# Patient Record
Sex: FEMALE | Race: BLACK OR AFRICAN AMERICAN | NOT HISPANIC OR LATINO
[De-identification: names, ages, dates, MRNs, and addresses within clinical notes are randomized per-mention and may not be internally consistent; named-entity substitution may affect disease eponyms.]

---

## 2017-03-29 ENCOUNTER — APPOINTMENT (OUTPATIENT)
Dept: OBGYN | Facility: CLINIC | Age: 32
End: 2017-03-29

## 2017-03-29 VITALS
WEIGHT: 163 LBS | HEIGHT: 65 IN | DIASTOLIC BLOOD PRESSURE: 85 MMHG | BODY MASS INDEX: 27.16 KG/M2 | SYSTOLIC BLOOD PRESSURE: 140 MMHG

## 2017-03-29 DIAGNOSIS — B96.89 ACUTE VAGINITIS: ICD-10-CM

## 2017-03-29 DIAGNOSIS — N76.0 ACUTE VAGINITIS: ICD-10-CM

## 2017-03-29 DIAGNOSIS — Z87.59 PERSONAL HISTORY OF OTHER COMPLICATIONS OF PREGNANCY, CHILDBIRTH AND THE PUERPERIUM: ICD-10-CM

## 2017-03-29 DIAGNOSIS — D35.2 BENIGN NEOPLASM OF PITUITARY GLAND: ICD-10-CM

## 2017-03-29 DIAGNOSIS — N89.8 OTHER SPECIFIED NONINFLAMMATORY DISORDERS OF VAGINA: ICD-10-CM

## 2017-03-29 RX ORDER — METRONIDAZOLE 7.5 MG/G
0.75 GEL VAGINAL
Qty: 70 | Refills: 1 | Status: ACTIVE | COMMUNITY
Start: 2017-03-29 | End: 1900-01-01

## 2017-03-29 RX ORDER — METRONIDAZOLE 7.5 MG/G
0.75 GEL VAGINAL
Qty: 7 | Refills: 1 | Status: ACTIVE | COMMUNITY
Start: 2017-03-29 | End: 1900-01-01

## 2017-03-29 RX ORDER — BORIC ACID
GRANULES (GRAM) MISCELLANEOUS
Qty: 21 | Refills: 0 | Status: ACTIVE | COMMUNITY
Start: 2017-03-29 | End: 1900-01-01

## 2017-03-30 LAB
CANDIDA VAG CYTO: NOT DETECTED
G VAGINALIS+PREV SP MTYP VAG QL MICRO: DETECTED
T VAGINALIS VAG QL WET PREP: NOT DETECTED

## 2017-03-31 LAB
C TRACH RRNA SPEC QL NAA+PROBE: NORMAL
N GONORRHOEA RRNA SPEC QL NAA+PROBE: NORMAL
SOURCE AMPLIFICATION: NORMAL

## 2017-05-04 ENCOUNTER — APPOINTMENT (OUTPATIENT)
Dept: OBGYN | Facility: CLINIC | Age: 32
End: 2017-05-04

## 2017-05-04 VITALS
WEIGHT: 160 LBS | HEIGHT: 65 IN | DIASTOLIC BLOOD PRESSURE: 84 MMHG | SYSTOLIC BLOOD PRESSURE: 136 MMHG | BODY MASS INDEX: 26.66 KG/M2

## 2017-05-04 DIAGNOSIS — N91.2 AMENORRHEA, UNSPECIFIED: ICD-10-CM

## 2017-05-04 LAB
HCG UR QL: NEGATIVE
QUALITY CONTROL: YES

## 2017-05-04 RX ORDER — MEDROXYPROGESTERONE ACETATE 10 MG/1
10 TABLET ORAL DAILY
Qty: 10 | Refills: 0 | Status: ACTIVE | COMMUNITY
Start: 2017-05-04 | End: 1900-01-01

## 2017-05-05 LAB
T3FREE SERPL-MCNC: 3.07 PG/ML
T4 FREE SERPL-MCNC: 1.1 NG/DL
TSH SERPL-ACNC: 2.12 UIU/ML

## 2017-05-09 LAB — PROLACTIN SERPL-MCNC: 58.2 NG/ML

## 2017-09-19 ENCOUNTER — RESULT REVIEW (OUTPATIENT)
Age: 32
End: 2017-09-19

## 2020-12-15 PROBLEM — N76.0 BACTERIAL VAGINOSIS: Status: RESOLVED | Noted: 2017-03-29 | Resolved: 2020-12-15

## 2024-06-07 ENCOUNTER — EMERGENCY (EMERGENCY)
Facility: HOSPITAL | Age: 39
LOS: 1 days | Discharge: HOME | End: 2024-06-07
Attending: EMERGENCY MEDICINE | Admitting: EMERGENCY MEDICINE
Payer: COMMERCIAL

## 2024-06-07 VITALS
RESPIRATION RATE: 20 BRPM | DIASTOLIC BLOOD PRESSURE: 83 MMHG | OXYGEN SATURATION: 100 % | TEMPERATURE: 98 F | SYSTOLIC BLOOD PRESSURE: 134 MMHG | HEART RATE: 71 BPM

## 2024-06-07 VITALS
HEART RATE: 108 BPM | RESPIRATION RATE: 20 BRPM | WEIGHT: 154.98 LBS | SYSTOLIC BLOOD PRESSURE: 139 MMHG | TEMPERATURE: 98 F | HEIGHT: 65 IN | DIASTOLIC BLOOD PRESSURE: 85 MMHG | OXYGEN SATURATION: 98 %

## 2024-06-07 LAB
ALBUMIN SERPL ELPH-MCNC: 4.3 G/DL — SIGNIFICANT CHANGE UP (ref 3.3–5)
ALP SERPL-CCNC: 99 U/L — SIGNIFICANT CHANGE UP (ref 40–120)
ALT FLD-CCNC: 26 U/L — SIGNIFICANT CHANGE UP (ref 4–33)
ANION GAP SERPL CALC-SCNC: 15 MMOL/L — HIGH (ref 7–14)
APPEARANCE UR: ABNORMAL
APTT BLD: 29.2 SEC — SIGNIFICANT CHANGE UP (ref 24.5–35.6)
AST SERPL-CCNC: 36 U/L — HIGH (ref 4–32)
BACTERIA # UR AUTO: NEGATIVE /HPF — SIGNIFICANT CHANGE UP
BASE EXCESS BLDV CALC-SCNC: -2.9 MMOL/L — LOW (ref -2–3)
BASOPHILS # BLD AUTO: 0.04 K/UL — SIGNIFICANT CHANGE UP (ref 0–0.2)
BASOPHILS NFR BLD AUTO: 0.4 % — SIGNIFICANT CHANGE UP (ref 0–2)
BILIRUB SERPL-MCNC: 0.5 MG/DL — SIGNIFICANT CHANGE UP (ref 0.2–1.2)
BILIRUB UR-MCNC: NEGATIVE — SIGNIFICANT CHANGE UP
BLD GP AB SCN SERPL QL: NEGATIVE — SIGNIFICANT CHANGE UP
BLOOD GAS VENOUS COMPREHENSIVE RESULT: SIGNIFICANT CHANGE UP
BUN SERPL-MCNC: 9 MG/DL — SIGNIFICANT CHANGE UP (ref 7–23)
CALCIUM SERPL-MCNC: 9.3 MG/DL — SIGNIFICANT CHANGE UP (ref 8.4–10.5)
CAST: 4 /LPF — SIGNIFICANT CHANGE UP (ref 0–4)
CHLORIDE BLDV-SCNC: 107 MMOL/L — SIGNIFICANT CHANGE UP (ref 96–108)
CHLORIDE SERPL-SCNC: 106 MMOL/L — SIGNIFICANT CHANGE UP (ref 98–107)
CO2 BLDV-SCNC: 23.9 MMOL/L — SIGNIFICANT CHANGE UP (ref 22–26)
CO2 SERPL-SCNC: 18 MMOL/L — LOW (ref 22–31)
COLOR SPEC: YELLOW — SIGNIFICANT CHANGE UP
CREAT SERPL-MCNC: 0.78 MG/DL — SIGNIFICANT CHANGE UP (ref 0.5–1.3)
DIFF PNL FLD: NEGATIVE — SIGNIFICANT CHANGE UP
EGFR: 98 ML/MIN/1.73M2 — SIGNIFICANT CHANGE UP
EOSINOPHIL # BLD AUTO: 0.05 K/UL — SIGNIFICANT CHANGE UP (ref 0–0.5)
EOSINOPHIL NFR BLD AUTO: 0.6 % — SIGNIFICANT CHANGE UP (ref 0–6)
GAS PNL BLDV: 138 MMOL/L — SIGNIFICANT CHANGE UP (ref 136–145)
GLUCOSE BLDV-MCNC: 110 MG/DL — HIGH (ref 70–99)
GLUCOSE SERPL-MCNC: 113 MG/DL — HIGH (ref 70–99)
GLUCOSE UR QL: NEGATIVE MG/DL — SIGNIFICANT CHANGE UP
HCG SERPL-ACNC: <1 MIU/ML — SIGNIFICANT CHANGE UP
HCG UR QL: NEGATIVE — SIGNIFICANT CHANGE UP
HCO3 BLDV-SCNC: 23 MMOL/L — SIGNIFICANT CHANGE UP (ref 22–29)
HCT VFR BLD CALC: 35.4 % — SIGNIFICANT CHANGE UP (ref 34.5–45)
HCT VFR BLDA CALC: 34 % — LOW (ref 34.5–46.5)
HGB BLD CALC-MCNC: 12.8 G/DL — SIGNIFICANT CHANGE UP (ref 11.7–16.1)
HGB BLD-MCNC: 12.6 G/DL — SIGNIFICANT CHANGE UP (ref 11.5–15.5)
IANC: 7.58 K/UL — HIGH (ref 1.8–7.4)
IMM GRANULOCYTES NFR BLD AUTO: 0.3 % — SIGNIFICANT CHANGE UP (ref 0–0.9)
INR BLD: 1.01 RATIO — SIGNIFICANT CHANGE UP (ref 0.85–1.18)
KETONES UR-MCNC: ABNORMAL MG/DL
LACTATE BLDV-MCNC: 1.9 MMOL/L — SIGNIFICANT CHANGE UP (ref 0.5–2)
LEUKOCYTE ESTERASE UR-ACNC: NEGATIVE — SIGNIFICANT CHANGE UP
LIDOCAIN IGE QN: 58 U/L — SIGNIFICANT CHANGE UP (ref 7–60)
LYMPHOCYTES # BLD AUTO: 0.84 K/UL — LOW (ref 1–3.3)
LYMPHOCYTES # BLD AUTO: 9.4 % — LOW (ref 13–44)
MCHC RBC-ENTMCNC: 31.9 PG — SIGNIFICANT CHANGE UP (ref 27–34)
MCHC RBC-ENTMCNC: 35.6 GM/DL — SIGNIFICANT CHANGE UP (ref 32–36)
MCV RBC AUTO: 89.6 FL — SIGNIFICANT CHANGE UP (ref 80–100)
MONOCYTES # BLD AUTO: 0.41 K/UL — SIGNIFICANT CHANGE UP (ref 0–0.9)
MONOCYTES NFR BLD AUTO: 4.6 % — SIGNIFICANT CHANGE UP (ref 2–14)
NEUTROPHILS # BLD AUTO: 7.58 K/UL — HIGH (ref 1.8–7.4)
NEUTROPHILS NFR BLD AUTO: 84.7 % — HIGH (ref 43–77)
NITRITE UR-MCNC: NEGATIVE — SIGNIFICANT CHANGE UP
NRBC # BLD: 0 /100 WBCS — SIGNIFICANT CHANGE UP (ref 0–0)
NRBC # FLD: 0 K/UL — SIGNIFICANT CHANGE UP (ref 0–0)
PCO2 BLDV: 41 MMHG — SIGNIFICANT CHANGE UP (ref 39–52)
PH BLDV: 7.35 — SIGNIFICANT CHANGE UP (ref 7.32–7.43)
PH UR: 6 — SIGNIFICANT CHANGE UP (ref 5–8)
PLATELET # BLD AUTO: 328 K/UL — SIGNIFICANT CHANGE UP (ref 150–400)
PO2 BLDV: 23 MMHG — LOW (ref 25–45)
POTASSIUM BLDV-SCNC: 3.4 MMOL/L — LOW (ref 3.5–5.1)
POTASSIUM SERPL-MCNC: 4 MMOL/L — SIGNIFICANT CHANGE UP (ref 3.5–5.3)
POTASSIUM SERPL-SCNC: 4 MMOL/L — SIGNIFICANT CHANGE UP (ref 3.5–5.3)
PROT SERPL-MCNC: 7.2 G/DL — SIGNIFICANT CHANGE UP (ref 6–8.3)
PROT UR-MCNC: 30 MG/DL
PROTHROM AB SERPL-ACNC: 11.3 SEC — SIGNIFICANT CHANGE UP (ref 9.5–13)
RBC # BLD: 3.95 M/UL — SIGNIFICANT CHANGE UP (ref 3.8–5.2)
RBC # FLD: 12.8 % — SIGNIFICANT CHANGE UP (ref 10.3–14.5)
RBC CASTS # UR COMP ASSIST: 4 /HPF — SIGNIFICANT CHANGE UP (ref 0–4)
REVIEW: SIGNIFICANT CHANGE UP
RH IG SCN BLD-IMP: POSITIVE — SIGNIFICANT CHANGE UP
SAO2 % BLDV: 41.8 % — LOW (ref 67–88)
SODIUM SERPL-SCNC: 139 MMOL/L — SIGNIFICANT CHANGE UP (ref 135–145)
SP GR SPEC: 1.04 — HIGH (ref 1–1.03)
SQUAMOUS # UR AUTO: 6 /HPF — HIGH (ref 0–5)
UROBILINOGEN FLD QL: 1 MG/DL — SIGNIFICANT CHANGE UP (ref 0.2–1)
WBC # BLD: 8.95 K/UL — SIGNIFICANT CHANGE UP (ref 3.8–10.5)
WBC # FLD AUTO: 8.95 K/UL — SIGNIFICANT CHANGE UP (ref 3.8–10.5)
WBC UR QL: 1 /HPF — SIGNIFICANT CHANGE UP (ref 0–5)

## 2024-06-07 PROCEDURE — 93010 ELECTROCARDIOGRAM REPORT: CPT

## 2024-06-07 PROCEDURE — 99285 EMERGENCY DEPT VISIT HI MDM: CPT

## 2024-06-07 PROCEDURE — 76830 TRANSVAGINAL US NON-OB: CPT | Mod: 26

## 2024-06-07 PROCEDURE — 74177 CT ABD & PELVIS W/CONTRAST: CPT | Mod: 26,MC

## 2024-06-07 RX ORDER — ONDANSETRON 8 MG/1
4 TABLET, FILM COATED ORAL ONCE
Refills: 0 | Status: COMPLETED | OUTPATIENT
Start: 2024-06-07 | End: 2024-06-07

## 2024-06-07 RX ORDER — SODIUM CHLORIDE 9 MG/ML
1000 INJECTION INTRAMUSCULAR; INTRAVENOUS; SUBCUTANEOUS ONCE
Refills: 0 | Status: COMPLETED | OUTPATIENT
Start: 2024-06-07 | End: 2024-06-07

## 2024-06-07 RX ORDER — ONDANSETRON 8 MG/1
1 TABLET, FILM COATED ORAL
Qty: 1 | Refills: 0
Start: 2024-06-07

## 2024-06-07 RX ORDER — MORPHINE SULFATE 50 MG/1
4 CAPSULE, EXTENDED RELEASE ORAL ONCE
Refills: 0 | Status: DISCONTINUED | OUTPATIENT
Start: 2024-06-07 | End: 2024-06-07

## 2024-06-07 RX ORDER — ACETAMINOPHEN 500 MG
1000 TABLET ORAL ONCE
Refills: 0 | Status: COMPLETED | OUTPATIENT
Start: 2024-06-07 | End: 2024-06-07

## 2024-06-07 RX ORDER — KETOROLAC TROMETHAMINE 30 MG/ML
15 SYRINGE (ML) INJECTION ONCE
Refills: 0 | Status: DISCONTINUED | OUTPATIENT
Start: 2024-06-07 | End: 2024-06-07

## 2024-06-07 RX ORDER — FAMOTIDINE 10 MG/ML
20 INJECTION INTRAVENOUS ONCE
Refills: 0 | Status: COMPLETED | OUTPATIENT
Start: 2024-06-07 | End: 2024-06-07

## 2024-06-07 RX ADMIN — Medication 1000 MILLIGRAM(S): at 10:10

## 2024-06-07 RX ADMIN — FAMOTIDINE 20 MILLIGRAM(S): 10 INJECTION INTRAVENOUS at 06:57

## 2024-06-07 RX ADMIN — Medication 15 MILLIGRAM(S): at 06:58

## 2024-06-07 RX ADMIN — ONDANSETRON 4 MILLIGRAM(S): 8 TABLET, FILM COATED ORAL at 06:57

## 2024-06-07 RX ADMIN — ONDANSETRON 4 MILLIGRAM(S): 8 TABLET, FILM COATED ORAL at 10:41

## 2024-06-07 RX ADMIN — Medication 400 MILLIGRAM(S): at 09:12

## 2024-06-07 RX ADMIN — SODIUM CHLORIDE 1000 MILLILITER(S): 9 INJECTION INTRAMUSCULAR; INTRAVENOUS; SUBCUTANEOUS at 06:56

## 2024-06-07 RX ADMIN — MORPHINE SULFATE 4 MILLIGRAM(S): 50 CAPSULE, EXTENDED RELEASE ORAL at 07:01

## 2024-06-07 RX ADMIN — Medication 1000 MILLIGRAM(S): at 09:30

## 2024-06-07 NOTE — ED PROVIDER NOTE - PATIENT PORTAL LINK FT
You can access the FollowMyHealth Patient Portal offered by [facility] by registering at the following website: [email or web address]. By joining [employer]’s FollowMyHealth portal, you will also be able to view your health information using other applications (apps) compatible with our system.

## 2024-06-07 NOTE — ED PROVIDER NOTE - ATTENDING CONTRIBUTION TO CARE
This is an otherwise healthy 39-year-old female no past surgical history presenting with sudden onset of abdominal pain.  Patient states started this morning and then accompanied by multiple episodes of vomiting.  States she has never felt this before.  Patient states it is severe and constant in nature.  Patient states it was worse in her upper abdomen but it is throughout.  She has no chest pain no shortness of breath no leg swelling no urinary symptoms no vaginal bleeding.  Patient states when this happened she was also diaphoretic.  Patient is uncomfortable appearing with generalized tenderness throughout her abdomen upon palpation.  She is well-nourished.  Regular rate and rhythm lungs are clear to auscultation she has generalized tenderness throughout her abdomen no rebound no guarding no CVA tenderness to palpation.  Moving all extremities pulses intact.  Will provide analgesia, antiemetic, fluids, labs will obtain both CT abdomen pelvis as well as transvaginal imaging to rule out any intra-abdominal pathology.  Patient signed out to oncoming team pending results.

## 2024-06-07 NOTE — CONSULT NOTE ADULT - ASSESSMENT
*incomplete note*    38 y/o P2 LMP 5/20 presenting with severe epigastric pain that woke her up at 4am. TVUS within normal limits, CTAP showing 3.6cm right ovarian dermoid cyst.  38 y/o P2 LMP 5/20 presenting with severe epigastric pain that woke her up at 4am. TVUS within normal limits, CTAP showing 3.6cm right ovarian dermoid cyst. Pelvic exam with no adnexal tenderness appreciated, abdominal exam with generalized abdominal tenderness. Vitals signs stable and labs within normal limits. Patient reports significant improvement in pain compared to initial presentation, denies nausea or vomiting within past 2 hours and has not needed additional pain management. Blood flow demonstrated on US. Patient with overall benign abdomen although with some mild tenderness to palpation on side of cyst. Low concern for torsion at this time given benign abdomen and clinical picture with overall well apperaing, well mentating patient.      - No acute GYN intervention at this time  - patient counseled on PO pain medication for pain relief prn   - patient instructed to follow up with OBGYN in one to two weeks   - patient cleared for discharge from OBGYN perspective.  Primary management per ED team.     Rachelle Eli,   Seen w/ Dr. Mendieta

## 2024-06-07 NOTE — ED PROVIDER NOTE - PHYSICAL EXAMINATION
General: Alert and Orientated x 3. Diaphoretic, anxious appearing   Eyes: No scleral icterus.   ENT: Mucous membranes moist  Cardiac: No murmurs appreciated.   Pulmonary: CTA bilaterally. No increased WOB.   Abdominal: Soft, Non-distended. Significant tenderness   Neurologic: No focal sensory or motor deficits.  Extremities: No lower extremity edema, bruising, soreness   Skin: Color appropriate for race. Intact, warm, and well-perfused.  Psychiatric: Appropriate mood and affect. No apparent risk to self or others.

## 2024-06-07 NOTE — ED ADULT NURSE NOTE - OBJECTIVE STATEMENT
Patient received in room 9. A&o4. Ambulatory. No past medical history. Came into Ed with complaints of abdominal cramping. States waking up at 4am with abdominal cramping, at 5am had multiple episodes of N/V. Patient appears diaphoretic and vomiting. No signs of acute distress noted. Respirations even and unlabored. Denies SOB, chest pain,D, fevers. Endorsing N/V. Comfort and safety maintained. Stretcher in lowest position. Call bell within reach. Pending MD orders. Patient received in room 9. A&o4. Ambulatory. No past medical history. Came into Ed with complaints of abdominal cramping. States waking up at 4am with abdominal cramping, at 5am had multiple episodes of N/V. LLQ pain worse than RLQ. Patient appears diaphoretic and vomiting. No signs of acute distress noted. Respirations even and unlabored. Denies SOB, chest pain,D, fevers. Endorsing N/V. Comfort and safety maintained. Stretcher in lowest position. Call bell within reach. Pending MD orders.

## 2024-06-07 NOTE — ED PROVIDER NOTE - NSFOLLOWUPINSTRUCTIONS_ED_ALL_ED_FT
You have been evaluated in the Emergency Department today for abdominal pain. Your evaluation did not show evidence of medical conditions requiring emergent intervention at this time.     You are found to have a dermoid cyst, 3.5 cm,  You were evaluated by gynecology who do not believe that your symptoms are due to the dermoid cyst. Your symptoms improved with medication.  You were prescribed zofran to be taken as needed for nausea.      Please follow up with gynecology within week.    Return to the Emergency Department if you experience worsening or uncontrolled pain, fevers 100.4°F or greater, recurrent vomiting, inability to tolerate food or fluids by mouth, bloody stools or vomit, black or tarry stools, or any other concerning symptoms.    Thank you for choosing us for your care.

## 2024-06-07 NOTE — ED ADULT NURSE REASSESSMENT NOTE - NS ED NURSE REASSESS COMMENT FT1
Pt c/o diffuse abdominal pain with N/V rating at 1010 pain scale. Dr. Daly aware. Pt taken to CT scan of abdomen. Ofirmev 1 gm given as ordered for abdominal pain 10/10 pain scale after return from Ct scan. Ekg also completed. Abdomen soft/nondistended, tender to any palpation. Pt verbalized understanding of plan of care.

## 2024-06-07 NOTE — ED PROVIDER NOTE - PROGRESS NOTE DETAILS
MD Malika (PGY-2) Received sign out on patient. In brief, 39-year-old female with no past medical history, presenting with abdominal pain.  Patient's vitals notable for tachycardia to 108, blood pressure stable at 139/85.  Patient is pending labs and imaging.  Dispo pending workup. MD Malika (PGY-2) Patient imaging reviewed. Patient with dermoid cyst. Given patient's severe pain, gyn consulted. Patient was aware of dermoid cyst. MD Malika (PGY-2) patient reassessed.  Patient reports improvement in her pain.  Denies any nausea vomiting at this time.  Still pending gyn recs. MD Malika (PGY-2) patient evaluated gynecology.  Gynecology does not believe patient symptoms are due to patient's dermoid cyst.  Will p.o. challenge patient in the ED and have her follow-up with gyn.. MD Malika (PGY-2) patient tolerated p.o. in the ED.  Discussed with pt results of work up, strict return precautions, and need for follow up.  Pt expressed understanding and agrees with plan.

## 2024-06-07 NOTE — CONSULT NOTE ADULT - SUBJECTIVE AND OBJECTIVE BOX
BARBRA LAINEZ  39y  Female [id number]    HPI:        Name of GYN Physician:   OBHx:    GynHx: Denies fibroids, cysts, endometriosis, STI's, Abnormal pap smears   PMH:  PSH:  Meds:  Allx:  Social History:  Denies smoking use, drug use, alcohol use.   +occasional social alcohol use    Vital Signs Last 24 Hrs  T(C): 36.9 (07 Jun 2024 06:13), Max: 36.9 (07 Jun 2024 06:13)  T(F): 98.4 (07 Jun 2024 06:13), Max: 98.4 (07 Jun 2024 06:13)  HR: 108 (07 Jun 2024 06:13) (108 - 108)  BP: 139/85 (07 Jun 2024 06:13) (139/85 - 139/85)  BP(mean): --  RR: 20 (07 Jun 2024 06:13) (20 - 20)  SpO2: 98% (07 Jun 2024 06:13) (98% - 98%)    Parameters below as of 07 Jun 2024 06:13  Patient On (Oxygen Delivery Method): room air        Physical Exam:   General: sitting comfortably in bed, NAD   HEENT: neck supple, full ROM  CV: RRR  Lungs: CTA b/l, good air flow b/l   Back: No CVA tenderness  Abd: Soft, non-tender, non-distended.  Bowel sounds present.    :  No bleeding on pad.    External labia wnl.  Bimanual exam with no cervical motion tenderness, uterus wnl, adnexa non palpable b/l.  Cervix closed vs. Cervix dilated  Speculum Exam: No active bleeding from os.  Physiologic discharge.    Ext: non-tender b/l, no edema     LABS:      Pregnancy Profile, Urine: Negative (06-07-24 @ 06:17)                          12.6   8.95  )-----------( 328      ( 07 Jun 2024 07:20 )             35.4     06-07    139  |  106  |  9   ----------------------------<  113<H>  4.0   |  18<L>  |  0.78    Ca    9.3      07 Jun 2024 07:20    TPro  7.2  /  Alb  4.3  /  TBili  0.5  /  DBili  x   /  AST  36<H>  /  ALT  26  /  AlkPhos  99  06-07    I&O's Detail    PT/INR - ( 07 Jun 2024 07:20 )   PT: 11.3 sec;   INR: 1.01 ratio         PTT - ( 07 Jun 2024 07:20 )  PTT:29.2 sec  Urinalysis Basic - ( 07 Jun 2024 07:20 )    Color: x / Appearance: x / SG: x / pH: x  Gluc: 113 mg/dL / Ketone: x  / Bili: x / Urobili: x   Blood: x / Protein: x / Nitrite: x   Leuk Esterase: x / RBC: x / WBC x   Sq Epi: x / Non Sq Epi: x / Bacteria: x        RADIOLOGY & ADDITIONAL STUDIES:     BARBRA LAINEZ  39y  Female [id number]    HPI: 40 y/o P2 LMP  presenting with severe epigastric pain that woke her up at 4am. Patient states she was in her normal state of health yesterday, went to bed without abdominal discomfort and then woke up with severe epigastric pain. Patient rates the pain a 10/10 and describes the pain as located in her upper abdomen primarily. She also admits to nausea and vomiting associated with the pain. She reports having a normal bowel movement yesterday, denies fevers, chills, diarrhea, SOB, chest pain, vaginal bleeding, abnormal vaginal discharge.       Name of GYN Physician:   OBHx: x1 , x1 pre-term vaginal delivery @6mo, pt states w/ placental abruption, x1 TOP w/ D&C  GynHx: Hx dermoid cyst that has been followed by her GYN. Denies fibroids, endometriosis, STI's, Abnormal pap smears   PMH: Denies  PSH: Denies  Meds: Denies  Allx: NKDA  Social History:  Denies regular smoking, drug use, alcohol use.  Admits to smoking socially    Vital Signs Last 24 Hrs  T(C): 36.9 (2024 06:13), Max: 36.9 (2024 06:13)  T(F): 98.4 (2024 06:13), Max: 98.4 (2024 06:13)  HR: 108 (2024 06:13) (108 - 108)  BP: 139/85 (2024 06:13) (139/85 - 139/85)  BP(mean): --  RR: 20 (2024 06:13) (20 - 20)  SpO2: 98% (2024 06:13) (98% - 98%)    Parameters below as of 2024 06:13  Patient On (Oxygen Delivery Method): room air        Physical Exam:   General: appears uncomfortable, no acute distress   HEENT: neck supple, full ROM  Back: No CVA tenderness  Abd: Soft, non-distended. Generalized abdominal tenderness  : No bleeding on pad. External labia wnl. Bimanual exam with no cervical motion tenderness, uterus wnl, adnexa non palpable b/l. Cervix closed.  Chaperone: Doris Daly MD      LABS:    Pregnancy Profile, Urine: Negative (24 @ 06:17)                          12.6   8.95  )-----------( 328      ( 2024 07:20 )             35.4         139  |  106  |  9   ----------------------------<  113<H>  4.0   |  18<L>  |  0.78    Ca    9.3      2024 07:20    TPro  7.2  /  Alb  4.3  /  TBili  0.5  /  DBili  x   /  AST  36<H>  /  ALT  26  /  AlkPhos  99      I&O's Detail    PT/INR - ( 2024 07:20 )   PT: 11.3 sec;   INR: 1.01 ratio         PTT - ( 2024 07:20 )  PTT:29.2 sec  Urinalysis Basic - ( 2024 07:20 )    Color: x / Appearance: x / SG: x / pH: x  Gluc: 113 mg/dL / Ketone: x  / Bili: x / Urobili: x   Blood: x / Protein: x / Nitrite: x   Leuk Esterase: x / RBC: x / WBC x   Sq Epi: x / Non Sq Epi: x / Bacteria: x        RADIOLOGY & ADDITIONAL STUDIES:  < from: CT Abdomen and Pelvis w/ IV Cont (24 @ 09:12) >    ACC: [id number] EXAM:  CT ABDOMEN AND PELVIS IC   ORDERED BY: KELLY TORRES     PROCEDURE DATE:  2024          INTERPRETATION:  CLINICAL INFORMATION: Abdominal pain. Evaluate   appendicitis, gallbladder, colitis.    COMPARISON: Pelvic sonogram 2024.    CONTRAST/COMPLICATIONS:  IV Contrast: Omnipaque 350  90 cc administered   10 cc discarded  Oral Contrast: NONE  Complications: None reported at time of study completion    PROCEDURE:  CT of the Abdomen and Pelvis was performed.  Sagittal and coronal reformats were performed.    FINDINGS:  LOWER CHEST: Within normal limits.    LIVER: Within normal limits.  BILE DUCTS: Normal caliber.  GALLBLADDER: Within normal limits.  SPLEEN: Within normal limits.  PANCREAS: Within normal limits.  ADRENALS: Within normal limits.  KIDNEYS/URETERS: Within normal limits.    BLADDER: Within normal limits.  REPRODUCTIVE ORGANS: Heterogeneous fat density lesion with calcifications   in the right adnexa measuring 3.6 x 2.6 cm, compatible with ovarian   dermoid.    BOWEL: No bowel obstruction or bowel wall thickening. Appendix within   normal limits.  PERITONEUM/RETROPERITONEUM: Trace pelvic free fluid.  VESSELS: Within normal limits.  LYMPH NODES: No lymphadenopathy.  ABDOMINAL WALL: Within normal limits.  BONES: Within normal limits.    IMPRESSION:  3.6 cm right ovarian dermoid. Trace pelvic free fluid.      --- End of Report ---        INA MENDOZA MD; Attending Radiologist  This document has been electronically signed. 2024  9:25AM    < end of copied text >    < from: US Transvaginal (24 @ 07:54) >    ACC: [id number] EXAM:  US TRANSVAGINAL   ORDERED BY: KELLY TORRES     PROCEDURE DATE:  2024          INTERPRETATION:  CLINICAL INFORMATION: Pelvic pain. Rule out torsion.    LMP: 2024    COMPARISON: None available.    TECHNIQUE:  Endovaginal pelvic sonogram only.    FINDINGS:  Uterus: 9.5 cm x 5.0 cm x 6.0 cm. There is an anterior intramural fibroid   measuring 1.1 x 1.4 x 1.7 cm.  Endometrium: 6 mm. Within normal limits.    Right ovary: 3.2 cm x 2.0 cm x 2.1 cm. Within normal limits.There is an   involuting corpus luteal cyst. Normal vascular flow is demonstrated   within the right ovary.  Left ovary: 3.0 cm x 1.7 cm x 2.0 cm. Within normal limits. Normal   vascular flow is demonstrated within the left ovary.    Fluid: None.    IMPRESSION:  Small intramural fibroid.  Normal ovaries; no evidence of ovarian torsion.      --- End of Report ---        JED ROACH MD; Attending Radiologist  This document has been electronically signed. 2024  8:16AM    < end of copied text >

## 2024-06-07 NOTE — ED PROVIDER NOTE - CLINICAL SUMMARY MEDICAL DECISION MAKING FREE TEXT BOX
40 y/o female no pmh presents with acute onset abdominal pain associated with vomiting this AM. She is hemodynamically stable, afebrile, on exam she appears very uncomfortable, diaphoretic, abdomen is very tender in periumbilical area with guarding. Given acuteness of presentation, concern for torsion vs ruptured cyst vs appendicitis vs gallstone vs renal stone. Will get labs, CT AP, give pain control, IVF, reassess.

## 2024-06-07 NOTE — ED ADULT TRIAGE NOTE - CHIEF COMPLAINT QUOTE
Abdominal pain and vomiting since this morning.  Pt says she woke up with pain and she took Naproxen at 5am which she threw up. Pt is vomiting in triage. Appears very uncomfortable. Denies fever or diarrhea

## 2024-06-07 NOTE — ED PROVIDER NOTE - OBJECTIVE STATEMENT
38 y/o female no pmh presents with severe onset abdominal pain. She woke up at 4 AM with the pain, soon after she began vomiting. The pain is felt throughout her abdomen, worst in periumbilical area. She was in normal state of health until this AM, no recent alcohol/drugs. LMP 2-3 weeks ago. No history abdominal surgery. She denies chest pain, SOB, headache/dizziness, dysuria/hematuria, leg swelling.

## 2024-06-07 NOTE — ED PROVIDER NOTE - ADDITIONAL NOTES AND INSTRUCTIONS:
To Whom it May Concern - Patient seen and evaluated in Emergency Room. Please excuse the above individual for medical care and recovery until date above. Thank you for you care. - Tyra Nelson MD.

## 2024-06-07 NOTE — ED ADULT NURSE REASSESSMENT NOTE - NS ED NURSE REASSESS COMMENT FT1
pt was evaluated by GYN resident , moved to  10 temporarily for examination by consult MD. No acute distress noted but pt states pain at 8/10 pain scale. Pt also nauseous and vomited x 1 . Dr. Daly aware. Pt given Zofran 4mg IVP for vomiting.

## 2024-06-08 LAB
CULTURE RESULTS: SIGNIFICANT CHANGE UP
SPECIMEN SOURCE: SIGNIFICANT CHANGE UP